# Patient Record
Sex: FEMALE | Race: AMERICAN INDIAN OR ALASKA NATIVE
[De-identification: names, ages, dates, MRNs, and addresses within clinical notes are randomized per-mention and may not be internally consistent; named-entity substitution may affect disease eponyms.]

---

## 2017-06-21 NOTE — CP.PCM.PN
Subjective





- Date & Time of Evaluation


Date of Evaluation: 06/21/17


Time of Evaluation: 11:10





- Subjective


Subjective: 





61 y/o female with PMHx of DM, CKD, HTN, hx of thyroid disorder on dialysis 

presents to [facility] for pre-operative evaluation. Patient states she has 10/10 pain 

on the outside of her R foot where she has a bony prominence. Patient states 

she has had prior surgery on the left foot for the same problem on the outside 

of the foot. Patient also has pain in the left big toe joint when she tries to 

flex and extend her big toe. Patient also complains of elongated hallux 

toenails and says she is having them removed today during surgery. Patient 

relates no prior issues with anesthesia during surgery. 





PSH: stents placed in R leg, fatty tissue removal from L breast, L foot surgery








Objective





- Vital Signs/Intake and Output


Vital Signs (last 24 hours): 


 











Temp Pulse Resp BP Pulse Ox


 


 98.4 F   75   18   143/77   100 


 


 06/21/17 10:58  06/21/17 10:58  06/21/17 10:58  06/21/17 10:58  06/21/17 10:58











- Constitutional


Appears: Well, Non-toxic, No Acute Distress





- Extremities Exam


Additional comments: 





Vasc: DP/PT 2/4 B/L. TG WNL. No edema noted. 


Derm:  Elongated dystrophic hallux toenails B/L.


Neuro: Protective sensation grossly intact.


Ortho: 








- Neurological Exam


Neurological Exam: Alert, Awake, Oriented x3





- Psychiatric Exam


Psychiatric exam: Normal Affect, Normal Mood





Assessment and Plan





- Assessment and Plan (Free Text)


Assessment: 





61 y/o female presents to [facility] with painful R 5th digit and painful L first MTP 

joint. 


Plan: 





Pt was seen and examined in [facility]


Pt NPO status was confirmed


All Pre-op testing and clearance was in the chart


Pt has exhausted all conservative treatment at this time and is opting for 

surgical intervention


Pt was explained procedure and post-operative course


All pt's questions were answered to satisfaction


No guarantees were made


Pt understands all risks, benefits and complications of procedure


Pt will follow-up with Dr Jones

## 2017-06-21 NOTE — PCM.SURG1
Surgeon's Initial Post Op Note





- Surgeon's Notes


Surgeon: Dr. Jones


Assistant: MARGIE Pisano PGY-3; MARGIE Cantor PGY-2


Type of Anesthesia: IV Sedation, Local


Anesthesia Administered By: Dr. Troy


Pre-Operative Diagnosis: Right foot- non-healing chronic ulceration sub-

metatarsal head 5; Left foot-painful hallux limitus with chronic non-healing 

ulcer to the distal hallux; Bilateral hallux- painful dystrphic toenails


Operative Findings: See dictation.  Hemostasis: PAT at 250mmHg bilaterally.  

Materials: Vilex Size 1 phalangeal base yolis-implant; 2-0 vicryl; 4-0 vicryl; 4-

0 nylon; betadine-soaked adaptic, rand, kerlix, tensoplast;.  Injectables: 

20cc 1:1 1% Lidocaine plain: 0.5% Marcaine plain to Right foot and 25cc 1:1 to 

Left foot pre-operatively; 10cc 0.5% Marcaine plain to bilateral feet post-op; 

1cc dexamethasone phosphate to Left foot post-op.  Condition: Stable.  

Complications: None


Post-Operative Diagnosis: Same as above


Operation Performed: 1. Right foot- 5th metatarsal head resection.  2. Left foot

- Flores bunionectomy with insertion of Vilex implant at base of proximal 

phalanx of the hallux.  3. Bilateral hallux- total nail avulsion


Specimen/Specimens Removed: Right foot- 5th metatarsal head.  Left foot- 

proximal phalanx base of hallux


Estimated Blood Loss: EBL {In ML}: 5


Blood Products Given: N/A


Drains Used: No Drains


Post-Op Condition: Good


Date of Surgery/Procedure: 06/21/17


Time of Surgery/Procedure: 13:00

## 2017-06-21 NOTE — RAD
PROCEDURE:  Left Foot Radiographs.



HISTORY:

s/p left foot surgery  



COMPARISON:

None. 7/14/2015 



FINDINGS:



BONES:

The prior ulcer of the great toe in the destructive changes of the 

1st distal phalanx a have largely normalized- mineralized. Here no 

suggested osteomyelitis is suggested. There is interval postsurgical 

changes of the 1st metatarsal phalangeal joint with hardware at the 

1st proximal phalanx. Re- shaping changes/osteotomy changes of the 

1st metatarsal head are suggested.  Sesamoid bones are still intact. 



The exuberant callus formation and bulbous deformity of the 2nd and 

3rd distal metatarsals has progressed since the prior exam some 

2nd-3rd intra metatarsal bridging bone here is suggested. Some 

possible bridging between the 2nd metatarsal and proximal phalanx is 

also a consideration. 



The trace tapering of the 4th and 5th distal metatarsals is renoted 

greater cortical margination is suggested on the current study. 



Hammertoe orientations and sissoring of the 3rd over the 2nd toe is 

noted 



Inferior posterior calcaneal spurring suggested. 



Overlying mid/forefoot bandaging noted 



JOINTS:

As above 



SOFT TISSUES:

As above 



OTHER FINDINGS:

None.



IMPRESSION:

Postsurgical and other interval changes as noted above changes as 

above

## 2017-06-21 NOTE — RAD
PROCEDURE:  Right Foot Radiographs.



HISTORY:

s/p right foot surgery  



COMPARISON:

None.



FINDINGS:



BONES:

Partial resection of the distal 5th metatarsal is noted there is mild 

osseous hypertrophy suggested over the 2nd 3rd 4th and only 5th 

proximal phalanges. No periosteal reaction or fracture seen. First 

interphalangeal subchondral sclerotic changes. Medial subcortical 

cyst and/or erosion suggested 1st proximal phalanx distal aspect. 

Distal phalangeal 1st digit exostoses. 



JOINTS:

As above 



SOFT TISSUES:

Mild fullness over the 5th metatarsal distal aspect. Overlying 

bandaging 



OTHER FINDINGS:

None.



IMPRESSION:

Postsurgical changes as above.  Other findings as well

## 2017-06-21 NOTE — CP.SDSHP
Same Day Surgery H & P





- History


Proposed Procedure: R foot 5th met head resection and L foot 1st MTP joint 

decompression with implant


Pre-Op Diagnosis: painful R 5th digit and painful L 1st MTP joint





- Previous Medical/Surgical History


Cardiac: Hypertension


Endocrine/Metabolic: Diabetes, Renal Disease


Pain: 4.Moderate Pain


Previous Surgical History: L foot surgery





- Allergies


Allergies: 


Allergies





No Known Allergies Allergy (Verified 12/15/15 16:20)


 











- Physical Exam


Vital Signs: 


 Vital Signs











  06/21/17





  10:58


 


Temperature 98.4 F


 


Pulse Rate 75


 


Respiratory 18





Rate 


 


Blood Pressure 143/77


 


O2 Sat by Pulse 100





Oximetry 











Mental Status: Alert & Oriented x3


Neuro: WNL





- {Optional Preform as Required}


Integument: Other (elongated dystrophic hallux toenails B/L)


Ortho: Other (pain in R 5th digit and L 1st MTPJ)





- Impression


Impression: Pt was seen and examined in [facility].  Pt NPO status was confirmed.  All 

Pre-op testing and clearance was in the chart.  Pt has exhausted all 

conservative treatment at this time and is opting for surgical intervention.  

Pt was explained procedure and post-operative course.  All pt's questions were 

answered to satisfaction.  No guarantees were made.  Pt understands all risks, 

benefits and complications of procedure.  Pt will follow-up with Dr. Jones





Short Stay Discharge





- Short Stay Discharge


Admitting Diagnosis/Reason for Visit: M21.6X1 L97.572 M20.42


Disposition: HOME/ ROUTINE


Referrals: 


Gladys Pepe MD [Primary Care Provider] - 


Amanda Jones DPM [Staff Provider] - 


Instructions:  RICE Therapy (GEN)


Additional Instructions (Diet, Activity): 


Patient in good/stable condition for discharge home. Pt to resume medications 

per medical reconciliation. Resume regular diet. 


Please keep dressing clean, dry, & intact to surgical site, use plastic bag 

over bandage for


showering, wear post op shoe at all times when ambulating, call clinic if you 

see


signs of infection (redness, swelling, malodor), please make an


appointment to see Dr. Jones in office/clinic within 1 week for post-op check.





Progress Note/Discharge Note with Instructions: 





- Patient evaluated bedside in recovery s/p surgical procedure.


- After surgical procedure patient in NAD


- (+) Void, (+) Appetite


- Capillary refill time <3s and NVSI intact.


- Patient denies complaints at this time


- Post operative instructions and plan of care explained to patient at length.


- Pt. acknowledges understanding.


- Patient stable for DC per podiatric surgery

## 2017-09-20 ENCOUNTER — HOSPITAL ENCOUNTER (OUTPATIENT)
Dept: HOSPITAL 14 - H.OPSURG | Age: 62
Discharge: HOME | End: 2017-09-20
Payer: MEDICARE

## 2017-09-20 VITALS — RESPIRATION RATE: 18 BRPM

## 2017-09-20 VITALS — SYSTOLIC BLOOD PRESSURE: 159 MMHG | DIASTOLIC BLOOD PRESSURE: 80 MMHG | TEMPERATURE: 97.7 F | OXYGEN SATURATION: 99 %

## 2017-09-20 VITALS — HEART RATE: 74 BPM

## 2017-09-20 VITALS — BODY MASS INDEX: 25.1 KG/M2

## 2017-09-20 DIAGNOSIS — I10: ICD-10-CM

## 2017-09-20 DIAGNOSIS — E11.9: ICD-10-CM

## 2017-09-20 DIAGNOSIS — M20.41: Primary | ICD-10-CM

## 2017-09-20 DIAGNOSIS — E78.00: ICD-10-CM

## 2017-09-20 LAB
BUN SERPL-MCNC: 68 MG/DL (ref 7–17)
CALCIUM SERPL-MCNC: 10.2 MG/DL (ref 8.4–10.2)
CHLORIDE SERPL-SCNC: 100 MMOL/L (ref 98–107)
CO2 SERPL-SCNC: 23 MMOL/L (ref 22–30)
GLUCOSE SERPL-MCNC: 72 MG/DL (ref 65–105)
POTASSIUM SERPL-SCNC: 4.3 MMOL/L (ref 3.6–5)
SODIUM SERPL-SCNC: 143 MMOL/L (ref 132–148)

## 2017-09-20 PROCEDURE — 73620 X-RAY EXAM OF FOOT: CPT

## 2017-09-20 PROCEDURE — 80048 BASIC METABOLIC PNL TOTAL CA: CPT

## 2017-09-20 PROCEDURE — 82948 REAGENT STRIP/BLOOD GLUCOSE: CPT

## 2017-09-20 PROCEDURE — 28285 REPAIR OF HAMMERTOE: CPT

## 2017-09-20 PROCEDURE — 36415 COLL VENOUS BLD VENIPUNCTURE: CPT

## 2017-09-20 PROCEDURE — 97116 GAIT TRAINING THERAPY: CPT

## 2017-09-20 PROCEDURE — 97161 PT EVAL LOW COMPLEX 20 MIN: CPT

## 2017-09-20 PROCEDURE — 88304 TISSUE EXAM BY PATHOLOGIST: CPT

## 2017-09-20 NOTE — RAD
PROCEDURE:  Left Foot Radiographs.



HISTORY:

S/p left foot surgery  



COMPARISON:

06/21/2017.



FINDINGS:



BONES:

Stable postoperative findings including multiple osteotomies.  

Orthopedic hardware proximal phalanx left 1st digit again identified. 

 No evidence of orthopedic hardware failure.  



JOINTS:

Normal. 



SOFT TISSUES:

Normal. 



OTHER FINDINGS:

None.



IMPRESSION:

Satisfactory postoperative status, no appreciable interval changes 

with respect osseous or articular structures.  Interval improvement 

in soft tissue swelling compared to the prior study.

## 2017-09-20 NOTE — CP.SDSHP
Same Day Surgery H & P





- History


Proposed Procedure: Arthroplasty 3rd, 4th DIPJ and PIPJ with possible 3rd digit 

amputation


Pre-Op Diagnosis: Left foot - Hammertoe of 3rd and 4th digit





- Allergies


Allergies: 


Allergies





No Known Allergies Allergy (Verified 09/20/17 06:26)


 











- Physical Exam


Vital Signs: 


 Vital Signs











  09/20/17





  06:46


 


Temperature 97.8 F


 


Pulse Rate 80


 


Respiratory 18





Rate 


 


Blood Pressure 165/84 H


 


O2 Sat by Pulse 99





Oximetry 











Mental Status: Alert & Oriented x3


Neuro: WNL


Heart: WNL


Lungs: WNL





- {Optional Preform as Required}


Ortho: Other





- Impression


Impression: Pt was seen and examined in [facility].  Pt NPO status was confirmed.  All 

Pre-op testing and clearance was in the chart.  Pt has exhausted all 

conservative treatment at this time and is opting for surgical intervention.  

Pt was explained procedure and post-operative course.  All pt's questions were 

answered to satisfaction.  No guarantees were made.  Pt understands all risks, 

benefits and complications of procedure.  Pt will follow-up with Dr. Jones





Short Stay Discharge





- Short Stay Discharge


Admitting Diagnosis/Reason for Visit: M20.41


Disposition: HOME/ ROUTINE


Referrals: 


Gladys Pepe MD [Primary Care Provider] - 


Additional Instructions (Diet, Activity): 


--Patient in good/stable condition for discharge home. Pt to resume medications 

per medical reconciliation. Resume regular diet. 


Please keep dressing clean, dry, & intact to surgical site, use plastic bag 

over bandage for


showering, wear post op shoe at all times when ambulating, call clinic if you 

see


signs of infection (redness, swelling, malodor), please make an


appointment to see Dr. Jones in office/clinic within 1 week for post-op check.





Progress Note/Discharge Note with Instructions: 





- Patient evaluated bedside in recovery s/p surgical procedure.


- After surgical procedure patient in NAD


- (+) Void, (+) Appetite


- Capillary refill time <3s and NVSI intact.


- Patient denies complaints at this time


- Post operative instructions and plan of care explained to patient at length.


- Pt. acknowledges understanding.


- Patient stable for DC per podiatric surgery

## 2017-09-20 NOTE — CP.PCM.PN
Subjective





- Date & Time of Evaluation


Date of Evaluation: 09/20/17


Time of Evaluation: 07:13





- Subjective


Subjective: 





61 y/o female seen at bedside in [facility] for left foot hammertoe correction. 

Patient appears in NAD and is AAOx3. Patient states that she had a prior 

surgery to that foot but the digits turned dark. Patient states that she has 

been NPO since midnight. Patient denies of any adverse reaction to anesthesia. 

Patient denies of any recent F/N/V/C/SOB/CP today. Patient denies of any other 

pedal complains at this time. 





PMHx: DM, HTN, Hypercholestrolemia


PSHX: Breast surgery, Stent placement in left lower extremity, 3 foot surgeries


Allergies: N.K.D.A


SHx: denies of any smoking, EtOH use or illicit drug usage





Objective





- Vital Signs/Intake and Output


Vital Signs (last 24 hours): 


 











Temp Pulse Resp BP Pulse Ox


 


 97.8 F   80   18   165/84 H  99 


 


 09/20/17 06:46  09/20/17 06:46  09/20/17 06:46  09/20/17 06:46  09/20/17 06:46











- Medications


Medications: 


 Current Medications





Vancomycin HCl 1 gm/ Sodium (Chloride)  250 mls @ 166.667 mls/hr IVPB ON CALL 

ONE


   Stop: 09/20/17 09:29


Cefazolin Sodium 2 gm/ Sodium (Chloride)  100 mls @ 100 mls/hr IVPB ONCE ONE


   Stop: 09/20/17 08:03


Sodium Chloride (Sodium Chloride 0.9%)  1,000 mls @ 110 mls/hr IV .[id number] [facility]


   Stop: 09/21/17 07:05











- Labs


Labs: 


 





 09/20/17 06:10 











- Constitutional


Appears: Well, Non-toxic, No Acute Distress





- Extremities Exam


Additional comments: 





Left lower extremity focused exam:





VASC: DP/PT pulses are palpable 2/4, Cap Refill time: < 3 sec to all digits, 

Temp gradient: warm to cool from proximal to distal, non-pitting edema noted on 

the dorsum and plantar aspect of the 3rd digit with hyperpigmented skin





DERM: surgical scars noted from the previous surgery, no open lesions, no 

erythema, no suspicion of active infection





NEURO: Protective sensation grossly intact





ORTHO: latarally deviating 2nd digit with mild overlap of the 2nd digit over 

the 3rd, hyperpigmented 3rd digit with mild hammering





- Neurological Exam


Neurological Exam: Alert, Awake, Oriented x3





- Psychiatric Exam


Psychiatric exam: Normal Affect, Normal Mood





Assessment and Plan





- Assessment and Plan (Free Text)


Assessment: 





61 y/o female seen at bedside in [facility] for hammertoe correction on the left foot


Plan: 





Pt was seen and examined in [facility]


Pt NPO status was confirmed


All Pre-op testing and clearance was in the chart


Pt has exhausted all conservative treatment at this time and is opting for 

surgical intervention


Pt was explained procedure and post-operative course


All pt's questions were answered to satisfaction


No guarantees were made


Pt understands all risks, benefits and complications of procedure


Pt will follow-up with Dr. Jones

## 2017-09-20 NOTE — PCM.SURG1
Surgeon's Initial Post Op Note





- Surgeon's Notes


Surgeon: Dr. Amanda Jones DPM


Assistant: Dr. Millie Casillas PGY-2, Dr. Kellie Motta PGY-1, Dr. Rosetta Triana PGY-1


Type of Anesthesia: General LMA


Anesthesia Administered By: Dr. Erickson


Pre-Operative Diagnosis: Left foot 2nd and 3rd digit hammertoe deformity


Operative Findings: See Dictation:  M: 2-0 vicryl, 4-0 nylon.  I: 13 cc of 1:1 

mixture of 1% lidocain plain : 0.5 % marcain plain.  6 cc of 0.5% Marcain plain


Post-Operative Diagnosis: Same


Operation Performed: Left foot 2nd and 3rd digit PIPJ arthroplasty, 3rd digit 

DIPJ arthroplasty


Specimen/Specimens Removed: none


Estimated Blood Loss: EBL {In ML}: 3


Blood Products Given: N/A


Drains Used: No Drains


Post-Op Condition: Good


Date of Surgery/Procedure: 09/20/17


Time of Surgery/Procedure: 09:05

## 2017-09-25 NOTE — OP
PROCEDURE DATE:  09/20/2017



PRIMARY SURGEON:  Amanda Jones DPM



ASSISTANT:  Rahul Jones DPM PGY 2; Kellie Motta DPM PGY 1; Jai Hayes, PGY 1.



ANESTHESIA TYPE:  MAC IV sedation with local.



PREOPERATIVE DIAGNOSES:

1.  Left foot second digit hammer toe deformity.

2.  Left third digit proximal interphalangeal joint and distal

interphalangeal joint hammer toe deformity with underlying cellulitic

changes.



POSTOPERATIVE DIAGNOSES:

1.  Left foot second digit hammer toe deformity.

2.  Left third digit proximal interphalangeal joint and distal

interphalangeal joint hammer toe deformity with underlying cellulitic

changes.



PROCEDURES PERFORMED:  Left foot second and third digit hammer toe repair.



SPECIMEN:  Left foot third digit bone.



INDICATIONS:  The patient is a 62-year-old female with the above stated

diagnoses.  The patient has exhausted all conservative outpatient treatment

options and is now agreed to surgical intervention.  The patient signed the

surgical consent after careful explanation risks, benefits, complications

and potential alternatives of the above surgical procedure.  No guarantees

were either given nor employed.



PREPARATION:  The patient was brought into the operating room after

confirming patient's n.p.o. status.  The patient was brought into the

operating room and placed on the operating room table in supine position. 

Once IV sedation was confirmed and had been achieved, the patient received

a total of 13 mL of 1:1 mixture of 0.5% Marcaine plain to 1% lidocaine

plain in a local block side fashion.  The patient then received well padded

pneumatic tourniquet in a supramalleolar position to the left ankle, set

for 250 mmHg to be inflated once the procedure began.  Once local

anesthesia was confirmed and had been achieved, the left toe was then

prepped and draped in the usual sterile manner and the procedure began.



PROCEDURE:  Left foot second and third digit arthroplasty.  Attention was

then directed to the dorsal aspect of the second digit where hammertoe

deformity was noted at the level of the proximal interphalangeal joint

which noted lateral deviation of the digit at this level.  An

approximately, 3 cm incision was made overlying the proximal

interphalangeal joint using a #15 blade.  This incision was then extended

down through subcutaneous tissue layers using a combination of sharp and

blunt dissection with care being taken to avoid all vital neurovascular

structures and bleeders were cauterized and ligated as needed.  At this

time, the extensor tendon was then noted and transverse tenotomy was

performed at the level of the proximal interphalangeal joint.  The head of

the proximal phalanx was then freed up with capsule and ligamentous

attachments.  Next, utilizing oscillating saw, the head of the proximal

phalanx was resected and passed from the operative filed.  This osteotomy

was fairly angulated with more bone resection medially to offset the

angulation deformity at the apex, previously noted preoperatively.  The

incision site was then flushed with copious amounts of sterile saline. 

Reduction of hammertoe deformity was then appreciated at this time as well

as angulation deformity was noted to be excellent.  Extensor digitorum

tendon was reapproximated using 4-0 Vicryl.  Skin was reapproximated using

nylon.



Attention was then directed to the dorsal aspect of the third digit.  Third

digit followed to sequential step to the second digit arthroplasty due to

complicated nature of central cellulitic compromise.  This decision was

done secondary to not contaminate the second digit with previously used

surgical tools.  Attention was then directed to the dorsal aspect of the

third digit with a 4 cm linear incision was made, extending over the head

of the proximal phalanx to the base of the distal phalanx using a #15

blade.  This incision was then extended down through soft tissue structures

using a #15 blade.  At this time, transverse tenotomies were performed

overlying the proximal interphalangeal joint and distal interphalangeal

joint.  Heads of the proximal and middle phalanx were freed up off their

fibrous and ligamentous attachments.  Next,  Oscillating saw was introduced

to the operating field and heads of the proximal and middle phalanx were

resected and passed off the operating fields.  Bone specimens from middle

phalanx was sent for pathology to evaluate for potential osteomyelitis. 

Incision site was then flushed with copious amounts of sterile saline mixed

with bacitracin.  Extensor digitorum tendons were reapproximated using

Vicryl.  Skin was reapproximated using Prolene.  Both incision sites were

then dressed with Betadine-soaked adaptic, 4 x 4 gauze, Kerlix, Ten, and

Elastoplast.



POSTOPERATIVE CONDITION:  The patient tolerated the anesthesia and

procedure well and was escorted to the recovery room with vital signs

stable and neurovascular status intact to the second and third digits of

the left foot specifically.  The patient has no complaints, no

complications postoperatively.  The patient will follow up with Dr. Jones

on an outpatient basis.







__________________________________________

Rahul Jones DPM





DD:  09/24/2017 17:13:52

DT:  09/25/2017 4:22:57

Job # [id number]

## 2017-12-20 ENCOUNTER — HOSPITAL ENCOUNTER (OUTPATIENT)
Dept: HOSPITAL 31 - C.SDS | Age: 62
Discharge: HOME | End: 2017-12-20
Attending: SURGERY
Payer: MEDICARE

## 2017-12-20 VITALS
TEMPERATURE: 97 F | SYSTOLIC BLOOD PRESSURE: 159 MMHG | HEART RATE: 60 BPM | DIASTOLIC BLOOD PRESSURE: 89 MMHG | RESPIRATION RATE: 18 BRPM

## 2017-12-20 VITALS — BODY MASS INDEX: 25.1 KG/M2

## 2017-12-20 VITALS — OXYGEN SATURATION: 100 %

## 2017-12-20 DIAGNOSIS — R22.9: ICD-10-CM

## 2017-12-20 DIAGNOSIS — D23.72: Primary | ICD-10-CM

## 2017-12-20 DIAGNOSIS — L91.8: ICD-10-CM

## 2017-12-20 RX ADMIN — LIDOCAINE HYDROCHLORIDE ONE ML: 20 SOLUTION ORAL; TOPICAL at 11:40

## 2017-12-20 RX ADMIN — BUPIVACAINE HYDROCHLORIDE ONE ML: 5 INJECTION, SOLUTION EPIDURAL; INTRACAUDAL; PERINEURAL at 11:07

## 2017-12-20 RX ADMIN — BUPIVACAINE HYDROCHLORIDE ONE ML: 5 INJECTION, SOLUTION EPIDURAL; INTRACAUDAL; PERINEURAL at 11:40

## 2017-12-20 RX ADMIN — LIDOCAINE HYDROCHLORIDE ONE ML: 20 SOLUTION ORAL; TOPICAL at 11:59

## 2018-01-24 ENCOUNTER — HOSPITAL ENCOUNTER (INPATIENT)
Dept: HOSPITAL 31 - C.ER | Age: 63
LOS: 4 days | Discharge: HOME | DRG: 682 | End: 2018-01-28
Payer: MEDICARE

## 2018-01-24 VITALS — BODY MASS INDEX: 25.1 KG/M2

## 2018-01-24 DIAGNOSIS — D63.1: ICD-10-CM

## 2018-01-24 DIAGNOSIS — N18.6: ICD-10-CM

## 2018-01-24 DIAGNOSIS — E78.00: ICD-10-CM

## 2018-01-24 DIAGNOSIS — Z87.891: ICD-10-CM

## 2018-01-24 DIAGNOSIS — E11.22: ICD-10-CM

## 2018-01-24 DIAGNOSIS — Z99.2: ICD-10-CM

## 2018-01-24 DIAGNOSIS — I12.0: Primary | ICD-10-CM

## 2018-01-24 DIAGNOSIS — K59.00: ICD-10-CM

## 2018-01-24 DIAGNOSIS — N25.81: ICD-10-CM

## 2018-01-24 DIAGNOSIS — R53.1: ICD-10-CM

## 2018-01-24 LAB
BUN SERPL-MCNC: 61 MG/DL (ref 7–17)
CALCIUM SERPL-MCNC: 9.5 MG/DL (ref 8.6–10.4)
ERYTHROCYTE [DISTWIDTH] IN BLOOD BY AUTOMATED COUNT: 17.1 % (ref 11.5–14.5)
GFR NON-AFRICAN AMERICAN: 2
HGB BLD-MCNC: 5.9 G/DL (ref 11–16)
MCH RBC QN AUTO: 28.7 PG (ref 27–31)
MCHC RBC AUTO-ENTMCNC: 32.9 G/DL (ref 33–37)
MCV RBC AUTO: 87.2 FL (ref 81–99)
PLATELET # BLD: 234 K/UL (ref 130–400)
PMV BLD AUTO: 7.7 FL (ref 7.2–11.7)
RBC # BLD AUTO: 2.04 MIL/UL (ref 3.8–5.2)
WBC # BLD AUTO: 11.1 K/UL (ref 4.8–10.8)

## 2018-01-25 LAB
BASOPHILS # BLD AUTO: 0 K/UL (ref 0–0.2)
BASOPHILS NFR BLD: 0.3 % (ref 0–2)
EOSINOPHIL # BLD AUTO: 0.3 K/UL (ref 0–0.7)
EOSINOPHIL NFR BLD: 3 % (ref 0–4)
ERYTHROCYTE [DISTWIDTH] IN BLOOD BY AUTOMATED COUNT: 15.8 % (ref 11.5–14.5)
FOLATE SERPL-MCNC: > 20 NG/ML
HGB BLD-MCNC: 6.5 G/DL (ref 11–16)
LYMPHOCYTES # BLD AUTO: 1.9 K/UL (ref 1–4.3)
LYMPHOCYTES NFR BLD AUTO: 19.9 % (ref 20–40)
MCH RBC QN AUTO: 29.9 PG (ref 27–31)
MCHC RBC AUTO-ENTMCNC: 34.1 G/DL (ref 33–37)
MCV RBC AUTO: 87.7 FL (ref 81–99)
MONOCYTES # BLD: 1.1 K/UL (ref 0–0.8)
MONOCYTES NFR BLD: 11.5 % (ref 0–10)
NEUTROPHILS # BLD: 6.2 K/UL (ref 1.8–7)
NEUTROPHILS NFR BLD AUTO: 65.3 % (ref 50–75)
NRBC BLD AUTO-RTO: 0.5 % (ref 0–2)
PLATELET # BLD: 201 K/UL (ref 130–400)
PMV BLD AUTO: 7.8 FL (ref 7.2–11.7)
RBC # BLD AUTO: 2.18 MIL/UL (ref 3.8–5.2)
VIT B12 SERPL-MCNC: > 1000 PG/ML (ref 239–931)
WBC # BLD AUTO: 9.6 K/UL (ref 4.8–10.8)

## 2018-01-25 PROCEDURE — 30233N1 TRANSFUSION OF NONAUTOLOGOUS RED BLOOD CELLS INTO PERIPHERAL VEIN, PERCUTANEOUS APPROACH: ICD-10-PCS

## 2018-01-25 RX ADMIN — Medication SCH TAB: at 18:12

## 2018-01-26 LAB
BUN SERPL-MCNC: 64 MG/DL (ref 7–17)
CALCIUM SERPL-MCNC: 9.8 MG/DL (ref 8.6–10.4)
ERYTHROCYTE [DISTWIDTH] IN BLOOD BY AUTOMATED COUNT: 15.9 % (ref 11.5–14.5)
GFR NON-AFRICAN AMERICAN: 2
HGB BLD-MCNC: 6.9 G/DL (ref 11–16)
MCH RBC QN AUTO: 30.3 PG (ref 27–31)
MCHC RBC AUTO-ENTMCNC: 34.4 G/DL (ref 33–37)
MCV RBC AUTO: 88.1 FL (ref 81–99)
PLATELET # BLD: 219 K/UL (ref 130–400)
PMV BLD AUTO: 7.6 FL (ref 7.2–11.7)
RBC # BLD AUTO: 2.26 MIL/UL (ref 3.8–5.2)
WBC # BLD AUTO: 8.9 K/UL (ref 4.8–10.8)

## 2018-01-26 RX ADMIN — Medication SCH TAB: at 10:22

## 2018-01-27 LAB
BASOPHILS # BLD AUTO: 0.1 K/UL (ref 0–0.2)
BASOPHILS NFR BLD: 0.6 % (ref 0–2)
EOSINOPHIL # BLD AUTO: 0.2 K/UL (ref 0–0.7)
EOSINOPHIL NFR BLD: 2.6 % (ref 0–4)
ERYTHROCYTE [DISTWIDTH] IN BLOOD BY AUTOMATED COUNT: 16.1 % (ref 11.5–14.5)
HGB BLD-MCNC: 9.2 G/DL (ref 11–16)
LYMPHOCYTES # BLD AUTO: 1.9 K/UL (ref 1–4.3)
LYMPHOCYTES NFR BLD AUTO: 20.8 % (ref 20–40)
MCH RBC QN AUTO: 29.8 PG (ref 27–31)
MCHC RBC AUTO-ENTMCNC: 34.1 G/DL (ref 33–37)
MCV RBC AUTO: 87.3 FL (ref 81–99)
MONOCYTES # BLD: 0.9 K/UL (ref 0–0.8)
MONOCYTES NFR BLD: 9.6 % (ref 0–10)
NEUTROPHILS # BLD: 6 K/UL (ref 1.8–7)
NEUTROPHILS NFR BLD AUTO: 66.4 % (ref 50–75)
NRBC BLD AUTO-RTO: 0.7 % (ref 0–2)
PLATELET # BLD: 234 K/UL (ref 130–400)
PMV BLD AUTO: 7.5 FL (ref 7.2–11.7)
RBC # BLD AUTO: 3.09 MIL/UL (ref 3.8–5.2)
WBC # BLD AUTO: 9 K/UL (ref 4.8–10.8)

## 2018-01-27 RX ADMIN — Medication SCH TAB: at 10:57

## 2018-01-28 VITALS — DIASTOLIC BLOOD PRESSURE: 81 MMHG | TEMPERATURE: 97.7 F | SYSTOLIC BLOOD PRESSURE: 145 MMHG | HEART RATE: 60 BPM

## 2018-01-28 VITALS — OXYGEN SATURATION: 98 %

## 2018-01-28 VITALS — RESPIRATION RATE: 20 BRPM

## 2018-01-28 RX ADMIN — Medication SCH TAB: at 09:47

## 2018-04-12 ENCOUNTER — HOSPITAL ENCOUNTER (EMERGENCY)
Dept: HOSPITAL 31 - C.ER | Age: 63
Discharge: HOME | End: 2018-04-12
Payer: MEDICARE

## 2018-04-12 VITALS — BODY MASS INDEX: 25.1 KG/M2

## 2018-04-12 VITALS — TEMPERATURE: 98.1 F | HEART RATE: 59 BPM | DIASTOLIC BLOOD PRESSURE: 83 MMHG | SYSTOLIC BLOOD PRESSURE: 168 MMHG

## 2018-04-12 VITALS — OXYGEN SATURATION: 97 %

## 2018-04-12 VITALS — RESPIRATION RATE: 20 BRPM

## 2018-04-12 DIAGNOSIS — Z87.891: ICD-10-CM

## 2018-04-12 DIAGNOSIS — I10: Primary | ICD-10-CM

## 2018-04-12 LAB
ALBUMIN SERPL-MCNC: 3.7 G/DL (ref 3.5–5)
ALBUMIN/GLOB SERPL: 1.1 {RATIO} (ref 1–2.1)
ALT SERPL-CCNC: 17 U/L (ref 9–52)
AST SERPL-CCNC: 16 U/L (ref 14–36)
BASOPHILS # BLD AUTO: 0.1 K/UL (ref 0–0.2)
BASOPHILS NFR BLD: 0.9 % (ref 0–2)
BUN SERPL-MCNC: 87 MG/DL (ref 7–17)
CALCIUM SERPL-MCNC: 8.8 MG/DL (ref 8.6–10.4)
EOSINOPHIL # BLD AUTO: 0.3 K/UL (ref 0–0.7)
EOSINOPHIL NFR BLD: 4.5 % (ref 0–4)
ERYTHROCYTE [DISTWIDTH] IN BLOOD BY AUTOMATED COUNT: 16.9 % (ref 11.5–14.5)
GFR NON-AFRICAN AMERICAN: 2
HGB BLD-MCNC: 10.5 G/DL (ref 11–16)
LYMPHOCYTES # BLD AUTO: 1.3 K/UL (ref 1–4.3)
LYMPHOCYTES NFR BLD AUTO: 21.1 % (ref 20–40)
MCH RBC QN AUTO: 28.8 PG (ref 27–31)
MCHC RBC AUTO-ENTMCNC: 32.9 G/DL (ref 33–37)
MCV RBC AUTO: 87.6 FL (ref 81–99)
MONOCYTES # BLD: 0.5 K/UL (ref 0–0.8)
MONOCYTES NFR BLD: 8.5 % (ref 0–10)
NEUTROPHILS # BLD: 4 K/UL (ref 1.8–7)
NEUTROPHILS NFR BLD AUTO: 65 % (ref 50–75)
NRBC BLD AUTO-RTO: 0 % (ref 0–2)
PLATELET # BLD: 136 K/UL (ref 130–400)
PMV BLD AUTO: 8.1 FL (ref 7.2–11.7)
RBC # BLD AUTO: 3.66 MIL/UL (ref 3.8–5.2)
WBC # BLD AUTO: 6.1 K/UL (ref 4.8–10.8)

## 2019-05-22 ENCOUNTER — HOSPITAL ENCOUNTER (INPATIENT)
Dept: HOSPITAL 31 - C.ER | Age: 64
LOS: 9 days | Discharge: HOME | DRG: 907 | End: 2019-05-31
Payer: MEDICARE

## 2019-05-22 VITALS — BODY MASS INDEX: 25.1 KG/M2

## 2019-05-22 DIAGNOSIS — E78.00: ICD-10-CM

## 2019-05-22 DIAGNOSIS — E11.622: ICD-10-CM

## 2019-05-22 DIAGNOSIS — K59.00: ICD-10-CM

## 2019-05-22 DIAGNOSIS — T85.611A: Primary | ICD-10-CM

## 2019-05-22 DIAGNOSIS — E11.22: ICD-10-CM

## 2019-05-22 DIAGNOSIS — N25.81: ICD-10-CM

## 2019-05-22 DIAGNOSIS — Z76.82: ICD-10-CM

## 2019-05-22 DIAGNOSIS — D63.1: ICD-10-CM

## 2019-05-22 DIAGNOSIS — E11.21: ICD-10-CM

## 2019-05-22 DIAGNOSIS — N18.6: ICD-10-CM

## 2019-05-22 DIAGNOSIS — Z99.2: ICD-10-CM

## 2019-05-22 DIAGNOSIS — I12.0: ICD-10-CM

## 2019-05-22 DIAGNOSIS — Z87.891: ICD-10-CM

## 2019-05-22 DIAGNOSIS — L98.499: ICD-10-CM

## 2019-05-22 DIAGNOSIS — N17.9: ICD-10-CM

## 2019-05-22 DIAGNOSIS — F41.9: ICD-10-CM

## 2019-05-22 LAB
ALBUMIN SERPL-MCNC: 4.3 G/DL (ref 3.5–5)
ALBUMIN/GLOB SERPL: 1.4 {RATIO} (ref 1–2.1)
ALT SERPL-CCNC: 49 U/L (ref 9–52)
APTT BLD: 36.6 SECONDS (ref 21–34)
AST SERPL-CCNC: 46 U/L (ref 14–36)
BASOPHILS # BLD AUTO: 0 K/UL (ref 0–0.2)
BASOPHILS NFR BLD: 0.4 % (ref 0–2)
BUN SERPL-MCNC: 67 MG/DL (ref 7–17)
CALCIUM SERPL-MCNC: 10.5 MG/DL (ref 8.6–10.4)
EOSINOPHIL # BLD AUTO: 0.2 K/UL (ref 0–0.7)
EOSINOPHIL NFR BLD: 3.5 % (ref 0–4)
ERYTHROCYTE [DISTWIDTH] IN BLOOD BY AUTOMATED COUNT: 19.3 % (ref 11.5–14.5)
GFR NON-AFRICAN AMERICAN: 2
HGB BLD-MCNC: 13.5 G/DL (ref 11–16)
INR PPP: 1.1
LYMPHOCYTES # BLD AUTO: 1.2 K/UL (ref 1–4.3)
LYMPHOCYTES NFR BLD AUTO: 18.1 % (ref 20–40)
MCH RBC QN AUTO: 29.2 PG (ref 27–31)
MCHC RBC AUTO-ENTMCNC: 32.4 G/DL (ref 33–37)
MCV RBC AUTO: 90 FL (ref 81–99)
MONOCYTES # BLD: 0.8 K/UL (ref 0–0.8)
MONOCYTES NFR BLD: 12 % (ref 0–10)
NEUTROPHILS # BLD: 4.3 K/UL (ref 1.8–7)
NEUTROPHILS NFR BLD AUTO: 66 % (ref 50–75)
NRBC BLD AUTO-RTO: 0.5 % (ref 0–2)
PLATELET # BLD: 200 K/UL (ref 130–400)
PMV BLD AUTO: 8.6 FL (ref 7.2–11.7)
PROTHROMBIN TIME: 12.5 SECONDS (ref 9.7–12.2)
RBC # BLD AUTO: 4.63 MIL/UL (ref 3.8–5.2)
WBC # BLD AUTO: 6.5 K/UL (ref 4.8–10.8)

## 2019-05-22 RX ADMIN — INSULIN ASPART SCH: 100 INJECTION, SOLUTION INTRAVENOUS; SUBCUTANEOUS at 21:20

## 2019-05-22 RX ADMIN — INSULIN ASPART SCH: 100 INJECTION, SOLUTION INTRAVENOUS; SUBCUTANEOUS at 17:06

## 2019-05-23 LAB
ALBUMIN SERPL-MCNC: 3.8 G/DL (ref 3.5–5)
ALBUMIN/GLOB SERPL: 1.4 {RATIO} (ref 1–2.1)
ALT SERPL-CCNC: 46 U/L (ref 9–52)
AST SERPL-CCNC: 29 U/L (ref 14–36)
BASOPHILS # BLD AUTO: 0 K/UL (ref 0–0.2)
BASOPHILS NFR BLD: 0.3 % (ref 0–2)
BUN SERPL-MCNC: 75 MG/DL (ref 7–17)
CALCIUM SERPL-MCNC: 9.7 MG/DL (ref 8.6–10.4)
EOSINOPHIL # BLD AUTO: 0.3 K/UL (ref 0–0.7)
EOSINOPHIL NFR BLD: 4.3 % (ref 0–4)
ERYTHROCYTE [DISTWIDTH] IN BLOOD BY AUTOMATED COUNT: 19.2 % (ref 11.5–14.5)
GFR NON-AFRICAN AMERICAN: 2
HEPATITIS B CORE AB: NEGATIVE
HEPATITIS C ANTIBODY: NEGATIVE
HGB BLD-MCNC: 12.8 G/DL (ref 11–16)
LYMPHOCYTES # BLD AUTO: 1.4 K/UL (ref 1–4.3)
LYMPHOCYTES NFR BLD AUTO: 23.3 % (ref 20–40)
MCH RBC QN AUTO: 29.1 PG (ref 27–31)
MCHC RBC AUTO-ENTMCNC: 31.8 G/DL (ref 33–37)
MCV RBC AUTO: 91.4 FL (ref 81–99)
MONOCYTES # BLD: 0.7 K/UL (ref 0–0.8)
MONOCYTES NFR BLD: 11.7 % (ref 0–10)
NEUTROPHILS # BLD: 3.6 K/UL (ref 1.8–7)
NEUTROPHILS NFR BLD AUTO: 60.4 % (ref 50–75)
NRBC BLD AUTO-RTO: 0.1 % (ref 0–2)
PLATELET # BLD: 201 K/UL (ref 130–400)
PMV BLD AUTO: 8.7 FL (ref 7.2–11.7)
RBC # BLD AUTO: 4.39 MIL/UL (ref 3.8–5.2)
WBC # BLD AUTO: 5.9 K/UL (ref 4.8–10.8)

## 2019-05-23 PROCEDURE — 0WPG03Z REMOVAL OF INFUSION DEVICE FROM PERITONEAL CAVITY, OPEN APPROACH: ICD-10-PCS | Performed by: SURGERY

## 2019-05-23 PROCEDURE — 5A1D70Z PERFORMANCE OF URINARY FILTRATION, INTERMITTENT, LESS THAN 6 HOURS PER DAY: ICD-10-PCS

## 2019-05-23 PROCEDURE — 06H033Z INSERTION OF INFUSION DEVICE INTO INFERIOR VENA CAVA, PERCUTANEOUS APPROACH: ICD-10-PCS | Performed by: SURGERY

## 2019-05-23 RX ADMIN — INSULIN ASPART SCH: 100 INJECTION, SOLUTION INTRAVENOUS; SUBCUTANEOUS at 16:30

## 2019-05-23 RX ADMIN — ACYCLOVIR SCH APPL: 50 OINTMENT TOPICAL at 09:40

## 2019-05-23 RX ADMIN — INSULIN ASPART SCH: 100 INJECTION, SOLUTION INTRAVENOUS; SUBCUTANEOUS at 22:15

## 2019-05-23 RX ADMIN — INSULIN ASPART SCH: 100 INJECTION, SOLUTION INTRAVENOUS; SUBCUTANEOUS at 07:42

## 2019-05-23 RX ADMIN — PANTOPRAZOLE SODIUM SCH MG: 40 TABLET, DELAYED RELEASE ORAL at 09:40

## 2019-05-23 RX ADMIN — ACYCLOVIR SCH: 50 OINTMENT TOPICAL at 18:00

## 2019-05-24 LAB
ALBUMIN SERPL-MCNC: 3.7 G/DL (ref 3.5–5)
ALBUMIN/GLOB SERPL: 1.3 {RATIO} (ref 1–2.1)
ALT SERPL-CCNC: 40 U/L (ref 9–52)
AST SERPL-CCNC: 45 U/L (ref 14–36)
BASOPHILS # BLD AUTO: 0 K/UL (ref 0–0.2)
BASOPHILS NFR BLD: 0.3 % (ref 0–2)
BUN SERPL-MCNC: 47 MG/DL (ref 7–17)
CALCIUM SERPL-MCNC: 9.5 MG/DL (ref 8.6–10.4)
EOSINOPHIL # BLD AUTO: 0.2 K/UL (ref 0–0.7)
EOSINOPHIL NFR BLD: 2.5 % (ref 0–4)
ERYTHROCYTE [DISTWIDTH] IN BLOOD BY AUTOMATED COUNT: 19.3 % (ref 11.5–14.5)
FERRITIN SERPL-MCNC: 692 NG/ML
GFR NON-AFRICAN AMERICAN: 3
HGB BLD-MCNC: 12.6 G/DL (ref 11–16)
LYMPHOCYTES # BLD AUTO: 0.9 K/UL (ref 1–4.3)
LYMPHOCYTES NFR BLD AUTO: 13 % (ref 20–40)
MCH RBC QN AUTO: 29.3 PG (ref 27–31)
MCHC RBC AUTO-ENTMCNC: 32.5 G/DL (ref 33–37)
MCV RBC AUTO: 90.2 FL (ref 81–99)
MONOCYTES # BLD: 0.7 K/UL (ref 0–0.8)
MONOCYTES NFR BLD: 9.8 % (ref 0–10)
NEUTROPHILS # BLD: 5.4 K/UL (ref 1.8–7)
NEUTROPHILS NFR BLD AUTO: 74.4 % (ref 50–75)
NRBC BLD AUTO-RTO: 0.1 % (ref 0–2)
PLATELET # BLD: 188 K/UL (ref 130–400)
PMV BLD AUTO: 8.4 FL (ref 7.2–11.7)
RBC # BLD AUTO: 4.3 MIL/UL (ref 3.8–5.2)
WBC # BLD AUTO: 7.3 K/UL (ref 4.8–10.8)

## 2019-05-24 RX ADMIN — INSULIN ASPART SCH: 100 INJECTION, SOLUTION INTRAVENOUS; SUBCUTANEOUS at 17:15

## 2019-05-24 RX ADMIN — STANDARDIZED SENNA CONCENTRATE AND DOCUSATE SODIUM SCH TAB: 8.6; 5 TABLET, FILM COATED ORAL at 13:09

## 2019-05-24 RX ADMIN — INSULIN ASPART SCH: 100 INJECTION, SOLUTION INTRAVENOUS; SUBCUTANEOUS at 22:04

## 2019-05-24 RX ADMIN — ACYCLOVIR SCH APPL: 50 OINTMENT TOPICAL at 10:07

## 2019-05-24 RX ADMIN — PANTOPRAZOLE SODIUM SCH MG: 40 TABLET, DELAYED RELEASE ORAL at 10:05

## 2019-05-24 RX ADMIN — INSULIN ASPART SCH: 100 INJECTION, SOLUTION INTRAVENOUS; SUBCUTANEOUS at 08:00

## 2019-05-24 RX ADMIN — ACYCLOVIR SCH APPL: 50 OINTMENT TOPICAL at 17:47

## 2019-05-24 RX ADMIN — INSULIN ASPART SCH: 100 INJECTION, SOLUTION INTRAVENOUS; SUBCUTANEOUS at 11:30

## 2019-05-25 LAB
ALBUMIN SERPL-MCNC: 3.3 G/DL (ref 3.5–5)
ALBUMIN/GLOB SERPL: 1.1 {RATIO} (ref 1–2.1)
ALT SERPL-CCNC: 30 U/L (ref 9–52)
AST SERPL-CCNC: 22 U/L (ref 14–36)
BUN SERPL-MCNC: 54 MG/DL (ref 7–17)
CALCIUM SERPL-MCNC: 9.8 MG/DL (ref 8.6–10.4)
ERYTHROCYTE [DISTWIDTH] IN BLOOD BY AUTOMATED COUNT: 18.5 % (ref 11.5–14.5)
GFR NON-AFRICAN AMERICAN: 3
HGB BLD-MCNC: 11.8 G/DL (ref 11–16)
MCH RBC QN AUTO: 29.8 PG (ref 27–31)
MCHC RBC AUTO-ENTMCNC: 33.1 G/DL (ref 33–37)
MCV RBC AUTO: 90.1 FL (ref 81–99)
PLATELET # BLD: 180 K/UL (ref 130–400)
PMV BLD AUTO: 8.2 FL (ref 7.2–11.7)
RBC # BLD AUTO: 3.97 MIL/UL (ref 3.8–5.2)
WBC # BLD AUTO: 6 K/UL (ref 4.8–10.8)

## 2019-05-25 RX ADMIN — PANTOPRAZOLE SODIUM SCH: 40 TABLET, DELAYED RELEASE ORAL at 10:00

## 2019-05-25 RX ADMIN — STANDARDIZED SENNA CONCENTRATE AND DOCUSATE SODIUM SCH: 8.6; 5 TABLET, FILM COATED ORAL at 10:00

## 2019-05-25 RX ADMIN — INSULIN ASPART SCH UNITS: 100 INJECTION, SOLUTION INTRAVENOUS; SUBCUTANEOUS at 17:45

## 2019-05-25 RX ADMIN — INSULIN ASPART SCH: 100 INJECTION, SOLUTION INTRAVENOUS; SUBCUTANEOUS at 22:00

## 2019-05-25 RX ADMIN — SODIUM CHLORIDE SCH UNITS: 9 INJECTION, SOLUTION INTRAMUSCULAR; INTRAVENOUS; SUBCUTANEOUS at 12:39

## 2019-05-25 RX ADMIN — INSULIN ASPART SCH: 100 INJECTION, SOLUTION INTRAVENOUS; SUBCUTANEOUS at 08:00

## 2019-05-25 RX ADMIN — ACYCLOVIR SCH APPL: 50 OINTMENT TOPICAL at 17:46

## 2019-05-25 RX ADMIN — ACYCLOVIR SCH APPL: 50 OINTMENT TOPICAL at 09:00

## 2019-05-25 RX ADMIN — INSULIN ASPART SCH: 100 INJECTION, SOLUTION INTRAVENOUS; SUBCUTANEOUS at 11:30

## 2019-05-26 RX ADMIN — INSULIN ASPART SCH: 100 INJECTION, SOLUTION INTRAVENOUS; SUBCUTANEOUS at 22:38

## 2019-05-26 RX ADMIN — INSULIN ASPART SCH UNITS: 100 INJECTION, SOLUTION INTRAVENOUS; SUBCUTANEOUS at 11:26

## 2019-05-26 RX ADMIN — INSULIN ASPART SCH: 100 INJECTION, SOLUTION INTRAVENOUS; SUBCUTANEOUS at 17:05

## 2019-05-26 RX ADMIN — ACYCLOVIR SCH APPL: 50 OINTMENT TOPICAL at 09:57

## 2019-05-26 RX ADMIN — PANTOPRAZOLE SODIUM SCH MG: 40 TABLET, DELAYED RELEASE ORAL at 09:56

## 2019-05-26 RX ADMIN — INSULIN ASPART SCH: 100 INJECTION, SOLUTION INTRAVENOUS; SUBCUTANEOUS at 07:30

## 2019-05-26 RX ADMIN — ACYCLOVIR SCH APPL: 50 OINTMENT TOPICAL at 17:38

## 2019-05-27 LAB
ALBUMIN SERPL-MCNC: 4.3 G/DL (ref 3.5–5)
ALBUMIN/GLOB SERPL: 1.4 {RATIO} (ref 1–2.1)
ALT SERPL-CCNC: 41 U/L (ref 9–52)
AST SERPL-CCNC: 47 U/L (ref 14–36)
BUN SERPL-MCNC: 17 MG/DL (ref 7–17)
CALCIUM SERPL-MCNC: 9.5 MG/DL (ref 8.6–10.4)
ERYTHROCYTE [DISTWIDTH] IN BLOOD BY AUTOMATED COUNT: 18.4 % (ref 11.5–14.5)
GFR NON-AFRICAN AMERICAN: 7
HGB BLD-MCNC: 12 G/DL (ref 11–16)
MCH RBC QN AUTO: 29.4 PG (ref 27–31)
MCHC RBC AUTO-ENTMCNC: 32.5 G/DL (ref 33–37)
MCV RBC AUTO: 90.4 FL (ref 81–99)
PLATELET # BLD: 176 K/UL (ref 130–400)
PMV BLD AUTO: 8 FL (ref 7.2–11.7)
RBC # BLD AUTO: 4.1 MIL/UL (ref 3.8–5.2)
WBC # BLD AUTO: 6.2 K/UL (ref 4.8–10.8)

## 2019-05-27 RX ADMIN — ACYCLOVIR SCH: 50 OINTMENT TOPICAL at 17:45

## 2019-05-27 RX ADMIN — INSULIN ASPART SCH: 100 INJECTION, SOLUTION INTRAVENOUS; SUBCUTANEOUS at 21:40

## 2019-05-27 RX ADMIN — INSULIN ASPART SCH: 100 INJECTION, SOLUTION INTRAVENOUS; SUBCUTANEOUS at 16:43

## 2019-05-27 RX ADMIN — ACYCLOVIR SCH APPL: 50 OINTMENT TOPICAL at 10:18

## 2019-05-27 RX ADMIN — INSULIN ASPART SCH UNITS: 100 INJECTION, SOLUTION INTRAVENOUS; SUBCUTANEOUS at 11:53

## 2019-05-27 RX ADMIN — PANTOPRAZOLE SODIUM SCH MG: 40 TABLET, DELAYED RELEASE ORAL at 10:13

## 2019-05-27 RX ADMIN — SODIUM CHLORIDE SCH UNITS: 9 INJECTION, SOLUTION INTRAMUSCULAR; INTRAVENOUS; SUBCUTANEOUS at 17:41

## 2019-05-27 RX ADMIN — INSULIN ASPART SCH: 100 INJECTION, SOLUTION INTRAVENOUS; SUBCUTANEOUS at 07:30

## 2019-05-28 LAB
ALBUMIN SERPL-MCNC: 3.5 G/DL (ref 3.5–5)
ALBUMIN/GLOB SERPL: 1.2 {RATIO} (ref 1–2.1)
ALT SERPL-CCNC: 41 U/L (ref 9–52)
APTT BLD: 37.2 SECONDS (ref 21–34)
AST SERPL-CCNC: 46 U/L (ref 14–36)
BASOPHILS # BLD AUTO: 0 K/UL (ref 0–0.2)
BASOPHILS NFR BLD: 0.3 % (ref 0–2)
BUN SERPL-MCNC: 30 MG/DL (ref 7–17)
CALCIUM SERPL-MCNC: 9.2 MG/DL (ref 8.6–10.4)
EOSINOPHIL # BLD AUTO: 0.1 K/UL (ref 0–0.7)
EOSINOPHIL NFR BLD: 2.3 % (ref 0–4)
ERYTHROCYTE [DISTWIDTH] IN BLOOD BY AUTOMATED COUNT: 18 % (ref 11.5–14.5)
GFR NON-AFRICAN AMERICAN: 5
HGB BLD-MCNC: 11.3 G/DL (ref 11–16)
INR PPP: 1.1
LYMPHOCYTES # BLD AUTO: 1.1 K/UL (ref 1–4.3)
LYMPHOCYTES NFR BLD AUTO: 17.4 % (ref 20–40)
MCH RBC QN AUTO: 29.5 PG (ref 27–31)
MCHC RBC AUTO-ENTMCNC: 32.8 G/DL (ref 33–37)
MCV RBC AUTO: 89.7 FL (ref 81–99)
MONOCYTES # BLD: 1 K/UL (ref 0–0.8)
MONOCYTES NFR BLD: 16.1 % (ref 0–10)
NEUTROPHILS # BLD: 4 K/UL (ref 1.8–7)
NEUTROPHILS NFR BLD AUTO: 63.9 % (ref 50–75)
NRBC BLD AUTO-RTO: 0 % (ref 0–2)
PLATELET # BLD: 168 K/UL (ref 130–400)
PMV BLD AUTO: 8.1 FL (ref 7.2–11.7)
PROTHROMBIN TIME: 12.4 SECONDS (ref 9.7–12.2)
RBC # BLD AUTO: 3.83 MIL/UL (ref 3.8–5.2)
WBC # BLD AUTO: 6.3 K/UL (ref 4.8–10.8)

## 2019-05-28 PROCEDURE — 031A0AF BYPASS LEFT ULNAR ARTERY TO LOWER ARM VEIN WITH AUTOLOGOUS ARTERIAL TISSUE, OPEN APPROACH: ICD-10-PCS | Performed by: SURGERY

## 2019-05-28 RX ADMIN — ACYCLOVIR SCH: 50 OINTMENT TOPICAL at 10:30

## 2019-05-28 RX ADMIN — INSULIN ASPART SCH: 100 INJECTION, SOLUTION INTRAVENOUS; SUBCUTANEOUS at 11:30

## 2019-05-28 RX ADMIN — PANTOPRAZOLE SODIUM SCH: 40 TABLET, DELAYED RELEASE ORAL at 10:30

## 2019-05-28 RX ADMIN — ACYCLOVIR SCH APPL: 50 OINTMENT TOPICAL at 17:37

## 2019-05-28 RX ADMIN — INSULIN ASPART SCH: 100 INJECTION, SOLUTION INTRAVENOUS; SUBCUTANEOUS at 07:30

## 2019-05-28 RX ADMIN — INSULIN ASPART SCH: 100 INJECTION, SOLUTION INTRAVENOUS; SUBCUTANEOUS at 22:00

## 2019-05-28 RX ADMIN — INSULIN ASPART SCH: 100 INJECTION, SOLUTION INTRAVENOUS; SUBCUTANEOUS at 17:37

## 2019-05-29 LAB
ALBUMIN SERPL-MCNC: 3.3 G/DL (ref 3.5–5)
ALBUMIN/GLOB SERPL: 1 {RATIO} (ref 1–2.1)
ALT SERPL-CCNC: 33 U/L (ref 9–52)
AST SERPL-CCNC: 31 U/L (ref 14–36)
BASOPHILS # BLD AUTO: 0 K/UL (ref 0–0.2)
BASOPHILS NFR BLD: 0.5 % (ref 0–2)
BUN SERPL-MCNC: 19 MG/DL (ref 7–17)
CALCIUM SERPL-MCNC: 9.1 MG/DL (ref 8.6–10.4)
EOSINOPHIL # BLD AUTO: 0.2 K/UL (ref 0–0.7)
EOSINOPHIL NFR BLD: 3 % (ref 0–4)
ERYTHROCYTE [DISTWIDTH] IN BLOOD BY AUTOMATED COUNT: 17.4 % (ref 11.5–14.5)
GFR NON-AFRICAN AMERICAN: 9
HGB BLD-MCNC: 10.5 G/DL (ref 11–16)
LYMPHOCYTES # BLD AUTO: 0.7 K/UL (ref 1–4.3)
LYMPHOCYTES NFR BLD AUTO: 13.1 % (ref 20–40)
MCH RBC QN AUTO: 29.1 PG (ref 27–31)
MCHC RBC AUTO-ENTMCNC: 32.7 G/DL (ref 33–37)
MCV RBC AUTO: 89.1 FL (ref 81–99)
MONOCYTES # BLD: 0.6 K/UL (ref 0–0.8)
MONOCYTES NFR BLD: 10.5 % (ref 0–10)
NEUTROPHILS # BLD: 4.2 K/UL (ref 1.8–7)
NEUTROPHILS NFR BLD AUTO: 72.9 % (ref 50–75)
NRBC BLD AUTO-RTO: 0 % (ref 0–2)
PLATELET # BLD: 153 K/UL (ref 130–400)
PMV BLD AUTO: 7.7 FL (ref 7.2–11.7)
RBC # BLD AUTO: 3.61 MIL/UL (ref 3.8–5.2)
WBC # BLD AUTO: 5.7 K/UL (ref 4.8–10.8)

## 2019-05-29 RX ADMIN — POLYETHYLENE GLYCOL 3350 SCH GM: 17 POWDER, FOR SOLUTION ORAL at 11:34

## 2019-05-29 RX ADMIN — INSULIN ASPART SCH: 100 INJECTION, SOLUTION INTRAVENOUS; SUBCUTANEOUS at 09:21

## 2019-05-29 RX ADMIN — SODIUM CHLORIDE SCH UNITS: 9 INJECTION, SOLUTION INTRAMUSCULAR; INTRAVENOUS; SUBCUTANEOUS at 17:08

## 2019-05-29 RX ADMIN — ACYCLOVIR SCH APPL: 50 OINTMENT TOPICAL at 18:14

## 2019-05-29 RX ADMIN — INSULIN ASPART SCH: 100 INJECTION, SOLUTION INTRAVENOUS; SUBCUTANEOUS at 21:59

## 2019-05-29 RX ADMIN — INSULIN ASPART SCH UNITS: 100 INJECTION, SOLUTION INTRAVENOUS; SUBCUTANEOUS at 13:09

## 2019-05-29 RX ADMIN — ACYCLOVIR SCH APPL: 50 OINTMENT TOPICAL at 09:24

## 2019-05-29 RX ADMIN — PANTOPRAZOLE SODIUM SCH MG: 40 TABLET, DELAYED RELEASE ORAL at 09:20

## 2019-05-29 RX ADMIN — INSULIN ASPART SCH: 100 INJECTION, SOLUTION INTRAVENOUS; SUBCUTANEOUS at 17:30

## 2019-05-30 RX ADMIN — PANTOPRAZOLE SODIUM SCH MG: 40 TABLET, DELAYED RELEASE ORAL at 09:24

## 2019-05-30 RX ADMIN — INSULIN ASPART SCH: 100 INJECTION, SOLUTION INTRAVENOUS; SUBCUTANEOUS at 21:57

## 2019-05-30 RX ADMIN — POLYETHYLENE GLYCOL 3350 SCH GM: 17 POWDER, FOR SOLUTION ORAL at 09:29

## 2019-05-30 RX ADMIN — INSULIN ASPART SCH UNITS: 100 INJECTION, SOLUTION INTRAVENOUS; SUBCUTANEOUS at 12:12

## 2019-05-30 RX ADMIN — ACYCLOVIR SCH APPL: 50 OINTMENT TOPICAL at 18:10

## 2019-05-30 RX ADMIN — INSULIN ASPART SCH: 100 INJECTION, SOLUTION INTRAVENOUS; SUBCUTANEOUS at 17:00

## 2019-05-30 RX ADMIN — INSULIN ASPART SCH UNITS: 100 INJECTION, SOLUTION INTRAVENOUS; SUBCUTANEOUS at 07:56

## 2019-05-31 VITALS
SYSTOLIC BLOOD PRESSURE: 159 MMHG | RESPIRATION RATE: 20 BRPM | HEART RATE: 86 BPM | TEMPERATURE: 98.7 F | DIASTOLIC BLOOD PRESSURE: 82 MMHG

## 2019-05-31 VITALS — OXYGEN SATURATION: 100 %

## 2019-05-31 RX ADMIN — INSULIN ASPART SCH: 100 INJECTION, SOLUTION INTRAVENOUS; SUBCUTANEOUS at 08:22

## 2019-05-31 RX ADMIN — PANTOPRAZOLE SODIUM SCH: 40 TABLET, DELAYED RELEASE ORAL at 09:18

## 2019-05-31 RX ADMIN — POLYETHYLENE GLYCOL 3350 SCH: 17 POWDER, FOR SOLUTION ORAL at 09:17

## 2019-05-31 RX ADMIN — ACYCLOVIR SCH: 50 OINTMENT TOPICAL at 09:18

## 2019-05-31 RX ADMIN — SODIUM CHLORIDE SCH UNITS: 9 INJECTION, SOLUTION INTRAMUSCULAR; INTRAVENOUS; SUBCUTANEOUS at 11:06
